# Patient Record
Sex: FEMALE | URBAN - METROPOLITAN AREA
[De-identification: names, ages, dates, MRNs, and addresses within clinical notes are randomized per-mention and may not be internally consistent; named-entity substitution may affect disease eponyms.]

---

## 2023-01-31 ENCOUNTER — TRANSCRIBE ORDER (OUTPATIENT)
Dept: SCHEDULING | Age: 63
End: 2023-01-31

## 2023-01-31 DIAGNOSIS — Z82.49 FAMILY HISTORY OF ASCVD: Primary | ICD-10-CM

## 2023-04-22 DIAGNOSIS — Z82.49 FAMILY HISTORY OF ASCVD: Primary | ICD-10-CM

## 2025-02-14 ENCOUNTER — OFFICE VISIT (OUTPATIENT)
Age: 65
End: 2025-02-14

## 2025-02-14 VITALS
HEIGHT: 67 IN | DIASTOLIC BLOOD PRESSURE: 70 MMHG | OXYGEN SATURATION: 97 % | SYSTOLIC BLOOD PRESSURE: 117 MMHG | HEART RATE: 62 BPM | TEMPERATURE: 97.3 F | RESPIRATION RATE: 18 BRPM | BODY MASS INDEX: 26.53 KG/M2 | WEIGHT: 169 LBS

## 2025-02-14 DIAGNOSIS — Z12.11 COLON CANCER SCREENING: ICD-10-CM

## 2025-02-14 DIAGNOSIS — Z87.39 HX OF OSTEOARTHRITIS: ICD-10-CM

## 2025-02-14 DIAGNOSIS — G25.81 RESTLESS LEG: ICD-10-CM

## 2025-02-14 DIAGNOSIS — N20.0 NEPHROLITHIASIS: ICD-10-CM

## 2025-02-14 DIAGNOSIS — Z12.31 ENCOUNTER FOR SCREENING MAMMOGRAM FOR MALIGNANT NEOPLASM OF BREAST: ICD-10-CM

## 2025-02-14 DIAGNOSIS — Z87.898 HISTORY OF PALPITATIONS: ICD-10-CM

## 2025-02-14 DIAGNOSIS — K80.50 BILIARY COLIC: ICD-10-CM

## 2025-02-14 DIAGNOSIS — Z76.89 ENCOUNTER TO ESTABLISH CARE: Primary | ICD-10-CM

## 2025-02-14 RX ORDER — DIPHENHYDRAMINE HCL 25 MG/1
CAPSULE, LIQUID FILLED ORAL
COMMUNITY

## 2025-02-14 RX ORDER — M-VIT,TX,IRON,MINS/CALC/FOLIC 27MG-0.4MG
1 TABLET ORAL DAILY
COMMUNITY

## 2025-02-14 RX ORDER — GABAPENTIN 100 MG/1
100 CAPSULE ORAL NIGHTLY
Qty: 30 CAPSULE | Refills: 2 | Status: SHIPPED | OUTPATIENT
Start: 2025-02-14 | End: 2025-08-13

## 2025-02-14 RX ORDER — FERROUS SULFATE 325(65) MG
325 TABLET ORAL
COMMUNITY

## 2025-02-14 SDOH — ECONOMIC STABILITY: FOOD INSECURITY: WITHIN THE PAST 12 MONTHS, THE FOOD YOU BOUGHT JUST DIDN'T LAST AND YOU DIDN'T HAVE MONEY TO GET MORE.: NEVER TRUE

## 2025-02-14 SDOH — ECONOMIC STABILITY: FOOD INSECURITY: WITHIN THE PAST 12 MONTHS, YOU WORRIED THAT YOUR FOOD WOULD RUN OUT BEFORE YOU GOT MONEY TO BUY MORE.: NEVER TRUE

## 2025-02-14 ASSESSMENT — PATIENT HEALTH QUESTIONNAIRE - PHQ9
10. IF YOU CHECKED OFF ANY PROBLEMS, HOW DIFFICULT HAVE THESE PROBLEMS MADE IT FOR YOU TO DO YOUR WORK, TAKE CARE OF THINGS AT HOME, OR GET ALONG WITH OTHER PEOPLE: NOT DIFFICULT AT ALL
6. FEELING BAD ABOUT YOURSELF - OR THAT YOU ARE A FAILURE OR HAVE LET YOURSELF OR YOUR FAMILY DOWN: NOT AT ALL
2. FEELING DOWN, DEPRESSED OR HOPELESS: NOT AT ALL
7. TROUBLE CONCENTRATING ON THINGS, SUCH AS READING THE NEWSPAPER OR WATCHING TELEVISION: NOT AT ALL
9. THOUGHTS THAT YOU WOULD BE BETTER OFF DEAD, OR OF HURTING YOURSELF: NOT AT ALL
3. TROUBLE FALLING OR STAYING ASLEEP: NOT AT ALL
SUM OF ALL RESPONSES TO PHQ QUESTIONS 1-9: 0
5. POOR APPETITE OR OVEREATING: NOT AT ALL
8. MOVING OR SPEAKING SO SLOWLY THAT OTHER PEOPLE COULD HAVE NOTICED. OR THE OPPOSITE, BEING SO FIGETY OR RESTLESS THAT YOU HAVE BEEN MOVING AROUND A LOT MORE THAN USUAL: NOT AT ALL
4. FEELING TIRED OR HAVING LITTLE ENERGY: NOT AT ALL
1. LITTLE INTEREST OR PLEASURE IN DOING THINGS: NOT AT ALL
SUM OF ALL RESPONSES TO PHQ9 QUESTIONS 1 & 2: 0

## 2025-02-14 NOTE — PROGRESS NOTES
Labs drawn in left arm per Dr Carter's orders.  Patient tolerated well.\"Have you been to the ER, urgent care clinic since your last visit?  Hospitalized since your last visit?\"    NO    “Have you seen or consulted any other health care providers outside our system since your last visit?”    NO    Have you had a mammogram?”   YES - [facility]: [facility]  Nurse/CMA to request most recent records if not in the chart    Date of last Mammogram: 6/15/2021      “Have you had a pap smear?”    YES - Where: [facility] Nurse/CMA to request most recent records if not in the chart    No cervical cancer screening on file     Sent record release  “Have you had a colorectal cancer screening such as a colonoscopy/FIT/Cologuard?    NO    No colonoscopy on file  No cologuard on file  No FIT/FOBT on file   No flexible sigmoidoscopy on file          
bedtime for 180 days. Intended supply: 90 days Max Daily Amount: 100 mg, Disp: 30 capsule, Rfl: 2    Social History     Tobacco Use   Smoking Status Never    Passive exposure: Never   Smokeless Tobacco Never       Review of Systems  ROS:  Gen: denies fever, chills, or fatigue  Resp: denies dyspnea, cough, or wheezing  CV: denies chest pain, pressure, or palpitations  GI:: denies abdominal pain, nausea, vomiting, diarrhea, or constipation  Neuro: denies numbness/tingling or dizziness          Objective:     /70 (Site: Left Upper Arm, Position: Sitting, Cuff Size: Large Adult)   Pulse 62   Temp 97.3 °F (36.3 °C) (Oral)   Resp 18   Ht 1.702 m (5' 7\")   Wt 76.7 kg (169 lb)   SpO2 97%   BMI 26.47 kg/m²   Body mass index is 26.47 kg/m².    Physical Exam   General: Alert and oriented. No acute distress.  Well nourished.  HEENT :  Eyes: Sclera white, conjunctiva clear.   Mouth: Pharynx non-erythematous, without exudate   Neck: Supple with FROM. No thyroid-megaly  Lungs: no increased wob, All lobes clear to auscultation bilaterally   Heart :RRR, no murmur   Extremities: Non-edematous  Abdomen: Soft and non-distended. Bowel sounds active. No tenderness to palpation, no masses.  Neuro: Cranial nerves grossly normal.  Mental status: Cognition, concentration, memory, and speech intact.   Psych: Mood and thought content appropriate for situation. Dressed appropriately and with good hygiene.  Skin: Warm, dry, and intact. No lesions or discoloration visible.      No results found for this visit on 02/14/25.      Assessment/ Plan:     Cheri was seen today for new patient.    Diagnoses and all orders for this visit:    Encounter to establish care  Records Requested.  Will further update chart when available    History of palpitations  Longstanding history of intermittent palpitations occurring regularly.  She is currently asymptomatic.  Will obtain copy of her recent EKG.  Discussed updating labs as below.  Recommended

## 2025-02-15 LAB
ALBUMIN SERPL-MCNC: 4.4 G/DL (ref 3.9–4.9)
ALP SERPL-CCNC: 79 IU/L (ref 44–121)
ALT SERPL-CCNC: 30 IU/L (ref 0–32)
AST SERPL-CCNC: 21 IU/L (ref 0–40)
BILIRUB SERPL-MCNC: 0.4 MG/DL (ref 0–1.2)
BUN SERPL-MCNC: 14 MG/DL (ref 8–27)
BUN/CREAT SERPL: 13 (ref 12–28)
CALCIUM SERPL-MCNC: 10 MG/DL (ref 8.7–10.3)
CHLORIDE SERPL-SCNC: 105 MMOL/L (ref 96–106)
CHOLEST SERPL-MCNC: 221 MG/DL (ref 100–199)
CO2 SERPL-SCNC: 24 MMOL/L (ref 20–29)
CREAT SERPL-MCNC: 1.12 MG/DL (ref 0.57–1)
EGFRCR SERPLBLD CKD-EPI 2021: 55 ML/MIN/1.73
ERYTHROCYTE [DISTWIDTH] IN BLOOD BY AUTOMATED COUNT: 12.5 % (ref 11.7–15.4)
GLOBULIN SER CALC-MCNC: 2.2 G/DL (ref 1.5–4.5)
GLUCOSE SERPL-MCNC: 94 MG/DL (ref 70–99)
HCT VFR BLD AUTO: 39.6 % (ref 34–46.6)
HDLC SERPL-MCNC: 65 MG/DL
HGB BLD-MCNC: 13.1 G/DL (ref 11.1–15.9)
IMP & REVIEW OF LAB RESULTS: NORMAL
LDLC SERPL CALC-MCNC: 127 MG/DL (ref 0–99)
MAGNESIUM SERPL-MCNC: 2.1 MG/DL (ref 1.6–2.3)
MCH RBC QN AUTO: 29.6 PG (ref 26.6–33)
MCHC RBC AUTO-ENTMCNC: 33.1 G/DL (ref 31.5–35.7)
MCV RBC AUTO: 90 FL (ref 79–97)
PLATELET # BLD AUTO: 282 X10E3/UL (ref 150–450)
POTASSIUM SERPL-SCNC: 4.9 MMOL/L (ref 3.5–5.2)
PROT SERPL-MCNC: 6.6 G/DL (ref 6–8.5)
RBC # BLD AUTO: 4.42 X10E6/UL (ref 3.77–5.28)
REPORT: NORMAL
REPORT: NORMAL
SODIUM SERPL-SCNC: 141 MMOL/L (ref 134–144)
TRIGL SERPL-MCNC: 164 MG/DL (ref 0–149)
TSH SERPL DL<=0.005 MIU/L-ACNC: 2.72 UIU/ML (ref 0.45–4.5)
VLDLC SERPL CALC-MCNC: 29 MG/DL (ref 5–40)
WBC # BLD AUTO: 6.1 X10E3/UL (ref 3.4–10.8)

## 2025-03-12 NOTE — PROGRESS NOTES
Subjective:     No chief complaint on file.      Cheri Santos is a 64 y.o. female who presents today for follow up     HPI    Hx of biliary colic.   States that on average 2x yearly will have RUQ pain and nausea.   Typically triggered by fatty foods   She has not had updated imaging in many years.  States she can avoid attacks by managing her diet and avoiding red meats   Had not been interested in establishing with general surgery to discuss cholecystectomy.    History of palpitations.  Labs at last visit overall unremarkable.  Had declined further workup including event monitor cardiology referral.  Since last visit she has been asymptomatic.    Did have mild decreased renal function at last visit. Does use nsaids intermittently.  Feels she has been staying hydrated.  No history of CKD.    RLS:   At last visit was started on gabapentin 100mg qhs  No side effects with this and overall feels this is working well   Sleep quality is improved and no daytime fatigue     Patient Active Problem List   Diagnosis    Biliary colic    Hx of osteoarthritis    Nephrolithiasis    Restless leg       Past Medical History:   Diagnosis Date    Gall stones     Kidney stones          Current Outpatient Medications:     diphenhydrAMINE HCl, Sleep, (SLEEP AID) 25 MG CAPS, Sleep Aid, Disp: , Rfl:     ferrous sulfate (IRON 325) 325 (65 Fe) MG tablet, Take 1 tablet by mouth daily (with breakfast), Disp: , Rfl:     Multiple Vitamins-Minerals (THERAPEUTIC MULTIVITAMIN-MINERALS) tablet, Take 1 tablet by mouth daily, Disp: , Rfl:     gabapentin (NEURONTIN) 100 MG capsule, Take 1 capsule by mouth at bedtime for 180 days. Intended supply: 90 days Max Daily Amount: 100 mg, Disp: 30 capsule, Rfl: 2    Social History     Tobacco Use   Smoking Status Never    Passive exposure: Never   Smokeless Tobacco Never       Review of Systems  ROS:  Gen: denies fever, chills, or fatigue  Resp: denies dyspnea, cough, or wheezing  CV: denies chest pain,

## 2025-03-13 ENCOUNTER — OFFICE VISIT (OUTPATIENT)
Age: 65
End: 2025-03-13
Payer: COMMERCIAL

## 2025-03-13 VITALS
HEIGHT: 67 IN | RESPIRATION RATE: 16 BRPM | OXYGEN SATURATION: 98 % | SYSTOLIC BLOOD PRESSURE: 109 MMHG | DIASTOLIC BLOOD PRESSURE: 65 MMHG | WEIGHT: 171.8 LBS | TEMPERATURE: 98.3 F | HEART RATE: 70 BPM | BODY MASS INDEX: 26.97 KG/M2

## 2025-03-13 DIAGNOSIS — Z12.11 COLON CANCER SCREENING: ICD-10-CM

## 2025-03-13 DIAGNOSIS — N17.9 AKI (ACUTE KIDNEY INJURY): ICD-10-CM

## 2025-03-13 DIAGNOSIS — G25.81 RESTLESS LEG: Primary | ICD-10-CM

## 2025-03-13 PROCEDURE — 99214 OFFICE O/P EST MOD 30 MIN: CPT | Performed by: STUDENT IN AN ORGANIZED HEALTH CARE EDUCATION/TRAINING PROGRAM

## 2025-03-13 RX ORDER — GABAPENTIN 100 MG/1
100 CAPSULE ORAL NIGHTLY
Qty: 90 CAPSULE | Refills: 0 | Status: SHIPPED | OUTPATIENT
Start: 2025-03-13 | End: 2025-09-09

## 2025-03-13 SDOH — ECONOMIC STABILITY: FOOD INSECURITY: WITHIN THE PAST 12 MONTHS, THE FOOD YOU BOUGHT JUST DIDN'T LAST AND YOU DIDN'T HAVE MONEY TO GET MORE.: NEVER TRUE

## 2025-03-13 SDOH — ECONOMIC STABILITY: FOOD INSECURITY: WITHIN THE PAST 12 MONTHS, YOU WORRIED THAT YOUR FOOD WOULD RUN OUT BEFORE YOU GOT MONEY TO BUY MORE.: NEVER TRUE

## 2025-03-13 ASSESSMENT — PATIENT HEALTH QUESTIONNAIRE - PHQ9
SUM OF ALL RESPONSES TO PHQ QUESTIONS 1-9: 0
SUM OF ALL RESPONSES TO PHQ QUESTIONS 1-9: 0
1. LITTLE INTEREST OR PLEASURE IN DOING THINGS: NOT AT ALL
SUM OF ALL RESPONSES TO PHQ QUESTIONS 1-9: 0
2. FEELING DOWN, DEPRESSED OR HOPELESS: NOT AT ALL
SUM OF ALL RESPONSES TO PHQ QUESTIONS 1-9: 0

## 2025-03-13 NOTE — PROGRESS NOTES
Vitals:    03/13/25 1453   BP: 109/65   Pulse: 70   Resp: 16   Temp: 98.3 °F (36.8 °C)   SpO2: 98%   \"Have you been to the ER, urgent care clinic since your last visit?  Hospitalized since your last visit?\"    NO    “Have you seen or consulted any other health care providers outside our system since your last visit?”    NO      “Have you had a colorectal cancer screening such as a colonoscopy/FIT/Cologuard?    NO    No colonoscopy on file  No cologuard on file  No FIT/FOBT on file   No flexible sigmoidoscopy on file

## 2025-03-14 ENCOUNTER — RESULTS FOLLOW-UP (OUTPATIENT)
Age: 65
End: 2025-03-14

## 2025-03-14 LAB
BUN SERPL-MCNC: 14 MG/DL (ref 8–27)
BUN/CREAT SERPL: 14 (ref 12–28)
CALCIUM SERPL-MCNC: 9.5 MG/DL (ref 8.7–10.3)
CHLORIDE SERPL-SCNC: 104 MMOL/L (ref 96–106)
CO2 SERPL-SCNC: 23 MMOL/L (ref 20–29)
CREAT SERPL-MCNC: 1.03 MG/DL (ref 0.57–1)
EGFRCR SERPLBLD CKD-EPI 2021: 61 ML/MIN/1.73
GLUCOSE SERPL-MCNC: 91 MG/DL (ref 70–99)
POTASSIUM SERPL-SCNC: 4.7 MMOL/L (ref 3.5–5.2)
SODIUM SERPL-SCNC: 140 MMOL/L (ref 134–144)

## 2025-03-29 LAB — NONINV COLON CA DNA+OCC BLD SCRN STL QL: NEGATIVE
